# Patient Record
Sex: FEMALE | Race: WHITE | NOT HISPANIC OR LATINO
[De-identification: names, ages, dates, MRNs, and addresses within clinical notes are randomized per-mention and may not be internally consistent; named-entity substitution may affect disease eponyms.]

---

## 2023-06-02 ENCOUNTER — NON-APPOINTMENT (OUTPATIENT)
Age: 33
End: 2023-06-02

## 2023-06-05 ENCOUNTER — NON-APPOINTMENT (OUTPATIENT)
Age: 33
End: 2023-06-05

## 2023-06-05 ENCOUNTER — LABORATORY RESULT (OUTPATIENT)
Age: 33
End: 2023-06-05

## 2023-06-05 ENCOUNTER — APPOINTMENT (OUTPATIENT)
Dept: OBGYN | Facility: CLINIC | Age: 33
End: 2023-06-05
Payer: COMMERCIAL

## 2023-06-05 ENCOUNTER — ASOB RESULT (OUTPATIENT)
Age: 33
End: 2023-06-05

## 2023-06-05 ENCOUNTER — APPOINTMENT (OUTPATIENT)
Dept: OBGYN | Facility: CLINIC | Age: 33
End: 2023-06-05

## 2023-06-05 VITALS
DIASTOLIC BLOOD PRESSURE: 68 MMHG | WEIGHT: 97 LBS | BODY MASS INDEX: 16.56 KG/M2 | HEIGHT: 64 IN | SYSTOLIC BLOOD PRESSURE: 102 MMHG

## 2023-06-05 DIAGNOSIS — F41.9 ANXIETY DISORDER, UNSPECIFIED: ICD-10-CM

## 2023-06-05 DIAGNOSIS — I73.00 RAYNAUD'S SYNDROME W/OUT GANGRENE: ICD-10-CM

## 2023-06-05 DIAGNOSIS — Z78.9 OTHER SPECIFIED HEALTH STATUS: ICD-10-CM

## 2023-06-05 DIAGNOSIS — N94.10 UNSPECIFIED DYSPAREUNIA: ICD-10-CM

## 2023-06-05 DIAGNOSIS — Z80.52 FAMILY HISTORY OF MALIGNANT NEOPLASM OF BLADDER: ICD-10-CM

## 2023-06-05 DIAGNOSIS — E55.9 VITAMIN D DEFICIENCY, UNSPECIFIED: ICD-10-CM

## 2023-06-05 DIAGNOSIS — Z83.3 FAMILY HISTORY OF DIABETES MELLITUS: ICD-10-CM

## 2023-06-05 DIAGNOSIS — Z82.0 FAMILY HISTORY OF EPILEPSY AND OTHER DISEASES OF THE NERVOUS SYSTEM: ICD-10-CM

## 2023-06-05 DIAGNOSIS — Z82.61 FAMILY HISTORY OF ARTHRITIS: ICD-10-CM

## 2023-06-05 DIAGNOSIS — Z13.1 ENCOUNTER FOR SCREENING FOR DIABETES MELLITUS: ICD-10-CM

## 2023-06-05 DIAGNOSIS — Z00.00 ENCOUNTER FOR GENERAL ADULT MEDICAL EXAMINATION W/OUT ABNORMAL FINDINGS: ICD-10-CM

## 2023-06-05 LAB
BILIRUB UR QL STRIP: NORMAL
GLUCOSE UR-MCNC: NORMAL
HCG UR QL: 0.2 EU/DL
HGB UR QL STRIP.AUTO: NORMAL
KETONES UR-MCNC: NORMAL
LEUKOCYTE ESTERASE UR QL STRIP: NORMAL
NITRITE UR QL STRIP: POSITIVE
PH UR STRIP: 7
PROT UR STRIP-MCNC: NORMAL
SP GR UR STRIP: 1.01

## 2023-06-05 PROCEDURE — 99205 OFFICE O/P NEW HI 60 MIN: CPT

## 2023-06-05 PROCEDURE — 36415 COLL VENOUS BLD VENIPUNCTURE: CPT

## 2023-06-05 PROCEDURE — 76817 TRANSVAGINAL US OBSTETRIC: CPT

## 2023-06-05 RX ORDER — PRENATAL VIT 49/IRON FUM/FOLIC 6.75-0.2MG
TABLET ORAL
Refills: 0 | Status: ACTIVE | COMMUNITY

## 2023-06-06 ENCOUNTER — NON-APPOINTMENT (OUTPATIENT)
Age: 33
End: 2023-06-06

## 2023-06-06 DIAGNOSIS — R79.89 OTHER SPECIFIED ABNORMAL FINDINGS OF BLOOD CHEMISTRY: ICD-10-CM

## 2023-06-06 LAB
25(OH)D3 SERPL-MCNC: 24.5 NG/ML
ALBUMIN SERPL ELPH-MCNC: 4.9 G/DL
ALP BLD-CCNC: 45 U/L
ALT SERPL-CCNC: 23 U/L
ANION GAP SERPL CALC-SCNC: 12 MMOL/L
AST SERPL-CCNC: 21 U/L
BILIRUB SERPL-MCNC: 1 MG/DL
BUN SERPL-MCNC: 9 MG/DL
CALCIUM SERPL-MCNC: 10.3 MG/DL
CHLORIDE SERPL-SCNC: 101 MMOL/L
CO2 SERPL-SCNC: 26 MMOL/L
COVID-19 NUCLEOCAPSID  GAM ANTIBODY INTERPRETATION: POSITIVE
COVID-19 SPIKE DOMAIN ANTIBODY INTERPRETATION: POSITIVE
CREAT SERPL-MCNC: 0.66 MG/DL
EGFR: 119 ML/MIN/1.73M2
ESTIMATED AVERAGE GLUCOSE: 111 MG/DL
FERRITIN SERPL-MCNC: 131 NG/ML
GLUCOSE SERPL-MCNC: 69 MG/DL
HBA1C MFR BLD HPLC: 5.5 %
HBV SURFACE AG SER QL: NONREACTIVE
HCV AB SER QL: NONREACTIVE
HCV S/CO RATIO: 0.12 S/CO
HIV1+2 AB SPEC QL IA.RAPID: NONREACTIVE
POTASSIUM SERPL-SCNC: 4.1 MMOL/L
PROT SERPL-MCNC: 7.4 G/DL
SARS-COV-2 AB SERPL IA-ACNC: >250 U/ML
SARS-COV-2 AB SERPL QL IA: 3.94 INDEX
SODIUM SERPL-SCNC: 139 MMOL/L
TSH SERPL-ACNC: 4.44 UIU/ML

## 2023-06-08 LAB
ABO + RH PNL BLD: NORMAL
BACTERIA UR CULT: NORMAL
BLD GP AB SCN SERPL QL: NORMAL
C TRACH RRNA SPEC QL NAA+PROBE: NOT DETECTED
HGB A MFR BLD: 97.4 %
HGB A2 MFR BLD: 2.6 %
HGB FRACT BLD-IMP: NORMAL
LEAD BLD-MCNC: <1 UG/DL
MEV IGG FLD QL IA: 290 AU/ML
MEV IGG+IGM SER-IMP: POSITIVE
MUV AB SER-ACNC: NEGATIVE
MUV IGG SER QL IA: <5 AU/ML
N GONORRHOEA RRNA SPEC QL NAA+PROBE: NOT DETECTED
RUBV IGG FLD-ACNC: 1.2 INDEX
RUBV IGG SER-IMP: POSITIVE
SOURCE AMPLIFICATION: NORMAL
T PALLIDUM AB SER QL IA: NEGATIVE
VZV AB TITR SER: POSITIVE
VZV IGG SER IF-ACNC: 504.4 INDEX

## 2023-06-09 ENCOUNTER — NON-APPOINTMENT (OUTPATIENT)
Age: 33
End: 2023-06-09

## 2023-06-11 ENCOUNTER — TRANSCRIPTION ENCOUNTER (OUTPATIENT)
Age: 33
End: 2023-06-11

## 2023-06-12 ENCOUNTER — TRANSCRIPTION ENCOUNTER (OUTPATIENT)
Age: 33
End: 2023-06-12

## 2023-07-07 ENCOUNTER — APPOINTMENT (OUTPATIENT)
Dept: OBGYN | Facility: CLINIC | Age: 33
End: 2023-07-07
Payer: COMMERCIAL

## 2023-07-07 VITALS
BODY MASS INDEX: 17.24 KG/M2 | DIASTOLIC BLOOD PRESSURE: 62 MMHG | HEIGHT: 64 IN | WEIGHT: 101 LBS | SYSTOLIC BLOOD PRESSURE: 106 MMHG

## 2023-07-07 LAB
BILIRUB UR QL STRIP: NORMAL
GLUCOSE UR-MCNC: NORMAL
HCG UR QL: 0.2 EU/DL
HGB UR QL STRIP.AUTO: NORMAL
KETONES UR-MCNC: NORMAL
LEUKOCYTE ESTERASE UR QL STRIP: NORMAL
NITRITE UR QL STRIP: NORMAL
PH UR STRIP: 7
PROT UR STRIP-MCNC: NORMAL
SP GR UR STRIP: 1.01

## 2023-07-07 PROCEDURE — 0502F SUBSEQUENT PRENATAL CARE: CPT

## 2023-07-07 RX ORDER — LEVOTHYROXINE SODIUM 50 UG/1
50 TABLET ORAL
Refills: 0 | Status: COMPLETED | COMMUNITY
End: 2023-07-07

## 2023-07-10 ENCOUNTER — NON-APPOINTMENT (OUTPATIENT)
Age: 33
End: 2023-07-10

## 2023-07-10 ENCOUNTER — TRANSCRIPTION ENCOUNTER (OUTPATIENT)
Age: 33
End: 2023-07-10

## 2023-07-19 ENCOUNTER — NON-APPOINTMENT (OUTPATIENT)
Age: 33
End: 2023-07-19

## 2023-07-27 ENCOUNTER — NON-APPOINTMENT (OUTPATIENT)
Age: 33
End: 2023-07-27

## 2023-08-01 ENCOUNTER — NON-APPOINTMENT (OUTPATIENT)
Age: 33
End: 2023-08-01

## 2023-08-01 ENCOUNTER — APPOINTMENT (OUTPATIENT)
Dept: OBGYN | Facility: CLINIC | Age: 33
End: 2023-08-01
Payer: COMMERCIAL

## 2023-08-01 VITALS
WEIGHT: 100.06 LBS | HEIGHT: 64 IN | BODY MASS INDEX: 17.08 KG/M2 | SYSTOLIC BLOOD PRESSURE: 110 MMHG | DIASTOLIC BLOOD PRESSURE: 56 MMHG

## 2023-08-01 DIAGNOSIS — Z34.91 ENCOUNTER FOR SUPERVISION OF NORMAL PREGNANCY, UNSPECIFIED, FIRST TRIMESTER: ICD-10-CM

## 2023-08-01 PROCEDURE — 0502F SUBSEQUENT PRENATAL CARE: CPT

## 2023-08-03 PROBLEM — Z34.91 FIRST TRIMESTER PREGNANCY: Status: RESOLVED | Noted: 2023-06-05 | Resolved: 2023-08-03

## 2023-08-07 ENCOUNTER — NON-APPOINTMENT (OUTPATIENT)
Age: 33
End: 2023-08-07

## 2023-08-15 ENCOUNTER — NON-APPOINTMENT (OUTPATIENT)
Age: 33
End: 2023-08-15

## 2023-08-31 ENCOUNTER — APPOINTMENT (OUTPATIENT)
Dept: OBGYN | Facility: CLINIC | Age: 33
End: 2023-08-31
Payer: COMMERCIAL

## 2023-08-31 VITALS
HEIGHT: 64 IN | SYSTOLIC BLOOD PRESSURE: 100 MMHG | WEIGHT: 104 LBS | DIASTOLIC BLOOD PRESSURE: 60 MMHG | BODY MASS INDEX: 17.75 KG/M2

## 2023-08-31 LAB
BILIRUB UR QL STRIP: NORMAL
GLUCOSE UR-MCNC: NORMAL
HCG UR QL: 0.2 EU/DL
HGB UR QL STRIP.AUTO: NORMAL
KETONES UR-MCNC: NORMAL
LEUKOCYTE ESTERASE UR QL STRIP: NORMAL
NITRITE UR QL STRIP: NORMAL
PH UR STRIP: 8.5
PROT UR STRIP-MCNC: NORMAL
SP GR UR STRIP: 1.01

## 2023-08-31 PROCEDURE — 0502F SUBSEQUENT PRENATAL CARE: CPT

## 2023-09-12 ENCOUNTER — NON-APPOINTMENT (OUTPATIENT)
Age: 33
End: 2023-09-12

## 2023-09-28 ENCOUNTER — APPOINTMENT (OUTPATIENT)
Dept: OBGYN | Facility: CLINIC | Age: 33
End: 2023-09-28
Payer: COMMERCIAL

## 2023-09-28 VITALS
SYSTOLIC BLOOD PRESSURE: 98 MMHG | DIASTOLIC BLOOD PRESSURE: 62 MMHG | BODY MASS INDEX: 18.44 KG/M2 | HEIGHT: 64 IN | WEIGHT: 108 LBS

## 2023-09-28 PROCEDURE — 0502F SUBSEQUENT PRENATAL CARE: CPT

## 2023-09-28 PROCEDURE — 90471 IMMUNIZATION ADMIN: CPT

## 2023-09-28 PROCEDURE — 90686 IIV4 VACC NO PRSV 0.5 ML IM: CPT

## 2023-10-04 ENCOUNTER — NON-APPOINTMENT (OUTPATIENT)
Age: 33
End: 2023-10-04

## 2023-10-04 ENCOUNTER — TRANSCRIPTION ENCOUNTER (OUTPATIENT)
Age: 33
End: 2023-10-04

## 2023-11-02 ENCOUNTER — MED ADMIN CHARGE (OUTPATIENT)
Age: 33
End: 2023-11-02

## 2023-11-02 ENCOUNTER — NON-APPOINTMENT (OUTPATIENT)
Age: 33
End: 2023-11-02

## 2023-11-02 ENCOUNTER — APPOINTMENT (OUTPATIENT)
Dept: OBGYN | Facility: CLINIC | Age: 33
End: 2023-11-02
Payer: COMMERCIAL

## 2023-11-02 VITALS
BODY MASS INDEX: 18.95 KG/M2 | WEIGHT: 111 LBS | HEIGHT: 64 IN | SYSTOLIC BLOOD PRESSURE: 112 MMHG | DIASTOLIC BLOOD PRESSURE: 60 MMHG

## 2023-11-02 LAB
BILIRUB UR QL STRIP: NORMAL
GLUCOSE UR-MCNC: NORMAL
HCG UR QL: 0.2 EU/DL
HGB UR QL STRIP.AUTO: NORMAL
KETONES UR-MCNC: NORMAL
LEUKOCYTE ESTERASE UR QL STRIP: NORMAL
NITRITE UR QL STRIP: NORMAL
PH UR STRIP: 7.5
PROT UR STRIP-MCNC: NORMAL
SP GR UR STRIP: 1.01

## 2023-11-02 PROCEDURE — 0502F SUBSEQUENT PRENATAL CARE: CPT

## 2023-11-02 PROCEDURE — 36415 COLL VENOUS BLD VENIPUNCTURE: CPT

## 2023-11-02 PROCEDURE — 90471 IMMUNIZATION ADMIN: CPT

## 2023-11-02 PROCEDURE — 90715 TDAP VACCINE 7 YRS/> IM: CPT

## 2023-11-04 ENCOUNTER — TRANSCRIPTION ENCOUNTER (OUTPATIENT)
Age: 33
End: 2023-11-04

## 2023-11-04 LAB
BASOPHILS # BLD AUTO: 0.04 K/UL
BASOPHILS NFR BLD AUTO: 0.4 %
EOSINOPHIL # BLD AUTO: 0.08 K/UL
EOSINOPHIL NFR BLD AUTO: 0.8 %
FERRITIN SERPL-MCNC: 25 NG/ML
GLUCOSE 1H P 50 G GLC PO SERPL-MCNC: 87 MG/DL
HCT VFR BLD CALC: 38.3 %
HGB BLD-MCNC: 12 G/DL
IMM GRANULOCYTES NFR BLD AUTO: 0.4 %
LYMPHOCYTES # BLD AUTO: 1.42 K/UL
LYMPHOCYTES NFR BLD AUTO: 14.5 %
MAN DIFF?: NORMAL
MCHC RBC-ENTMCNC: 31.3 GM/DL
MCHC RBC-ENTMCNC: 31.7 PG
MCV RBC AUTO: 101.1 FL
MONOCYTES # BLD AUTO: 0.58 K/UL
MONOCYTES NFR BLD AUTO: 5.9 %
NEUTROPHILS # BLD AUTO: 7.64 K/UL
NEUTROPHILS NFR BLD AUTO: 78 %
PLATELET # BLD AUTO: 198 K/UL
RBC # BLD: 3.79 M/UL
RBC # FLD: 13.2 %
WBC # FLD AUTO: 9.8 K/UL

## 2023-11-06 ENCOUNTER — TRANSCRIPTION ENCOUNTER (OUTPATIENT)
Age: 33
End: 2023-11-06

## 2023-11-10 ENCOUNTER — NON-APPOINTMENT (OUTPATIENT)
Age: 33
End: 2023-11-10

## 2023-11-20 ENCOUNTER — APPOINTMENT (OUTPATIENT)
Dept: OBGYN | Facility: CLINIC | Age: 33
End: 2023-11-20
Payer: COMMERCIAL

## 2023-11-20 VITALS
WEIGHT: 113 LBS | SYSTOLIC BLOOD PRESSURE: 96 MMHG | BODY MASS INDEX: 19.29 KG/M2 | HEIGHT: 64 IN | DIASTOLIC BLOOD PRESSURE: 60 MMHG

## 2023-11-20 PROCEDURE — 0502F SUBSEQUENT PRENATAL CARE: CPT

## 2023-12-05 ENCOUNTER — NON-APPOINTMENT (OUTPATIENT)
Age: 33
End: 2023-12-05

## 2023-12-05 ENCOUNTER — APPOINTMENT (OUTPATIENT)
Dept: OBGYN | Facility: CLINIC | Age: 33
End: 2023-12-05
Payer: COMMERCIAL

## 2023-12-05 VITALS
DIASTOLIC BLOOD PRESSURE: 70 MMHG | WEIGHT: 114.31 LBS | BODY MASS INDEX: 19.52 KG/M2 | HEIGHT: 64 IN | SYSTOLIC BLOOD PRESSURE: 104 MMHG

## 2023-12-05 PROCEDURE — 0502F SUBSEQUENT PRENATAL CARE: CPT

## 2023-12-06 LAB
BILIRUB UR QL STRIP: NORMAL
GLUCOSE UR-MCNC: NORMAL
HCG UR QL: 0.2 EU/DL
HGB UR QL STRIP.AUTO: NORMAL
KETONES UR-MCNC: NORMAL
LEUKOCYTE ESTERASE UR QL STRIP: NORMAL
NITRITE UR QL STRIP: NORMAL
PH UR STRIP: 6
PROT UR STRIP-MCNC: NORMAL
SP GR UR STRIP: <=1.005

## 2023-12-15 ENCOUNTER — NON-APPOINTMENT (OUTPATIENT)
Age: 33
End: 2023-12-15

## 2023-12-19 ENCOUNTER — APPOINTMENT (OUTPATIENT)
Dept: ENDOCRINOLOGY | Facility: CLINIC | Age: 33
End: 2023-12-19

## 2023-12-21 DIAGNOSIS — Z32.00 ENCOUNTER FOR PREGNANCY TEST, RESULT UNKNOWN: ICD-10-CM

## 2023-12-21 DIAGNOSIS — Z20.822 CONTACT WITH AND (SUSPECTED) EXPOSURE TO COVID-19: ICD-10-CM

## 2023-12-21 DIAGNOSIS — Z34.02 ENCOUNTER FOR SUPERVISION OF NORMAL FIRST PREGNANCY, SECOND TRIMESTER: ICD-10-CM

## 2023-12-22 ENCOUNTER — APPOINTMENT (OUTPATIENT)
Dept: OBGYN | Facility: CLINIC | Age: 33
End: 2023-12-22
Payer: COMMERCIAL

## 2023-12-22 VITALS
SYSTOLIC BLOOD PRESSURE: 100 MMHG | WEIGHT: 117 LBS | DIASTOLIC BLOOD PRESSURE: 64 MMHG | HEIGHT: 64 IN | BODY MASS INDEX: 19.97 KG/M2

## 2023-12-22 DIAGNOSIS — Z34.93 ENCOUNTER FOR SUPERVISION OF NORMAL PREGNANCY, UNSPECIFIED, THIRD TRIMESTER: ICD-10-CM

## 2023-12-22 DIAGNOSIS — Z23 ENCOUNTER FOR IMMUNIZATION: ICD-10-CM

## 2023-12-22 PROCEDURE — 0502F SUBSEQUENT PRENATAL CARE: CPT

## 2023-12-22 RX ORDER — LEVOTHYROXINE SODIUM 50 UG/1
50 CAPSULE ORAL
Refills: 0 | Status: ACTIVE | COMMUNITY

## 2023-12-22 RX ORDER — LEVOTHYROXINE SODIUM 0.1 MG/1
100 TABLET ORAL
Refills: 0 | Status: ACTIVE | COMMUNITY

## 2023-12-23 PROBLEM — Z23 IMMUNIZATION DUE: Status: RESOLVED | Noted: 2023-09-28 | Resolved: 2023-12-23

## 2023-12-23 LAB
BILIRUB UR QL STRIP: NORMAL
COLLECTION METHOD: NORMAL
GLUCOSE UR-MCNC: NORMAL
HCG UR QL: 0.2 EU/DL
HGB UR QL STRIP.AUTO: NORMAL
KETONES UR-MCNC: NORMAL
LEUKOCYTE ESTERASE UR QL STRIP: NORMAL
NITRITE UR QL STRIP: NORMAL
PH UR STRIP: 7
PROT UR STRIP-MCNC: NORMAL
SP GR UR STRIP: 1.01

## 2023-12-26 ENCOUNTER — NON-APPOINTMENT (OUTPATIENT)
Age: 33
End: 2023-12-26

## 2023-12-27 ENCOUNTER — NON-APPOINTMENT (OUTPATIENT)
Age: 33
End: 2023-12-27

## 2023-12-29 ENCOUNTER — LABORATORY RESULT (OUTPATIENT)
Age: 33
End: 2023-12-29

## 2023-12-29 ENCOUNTER — APPOINTMENT (OUTPATIENT)
Dept: OBGYN | Facility: CLINIC | Age: 33
End: 2023-12-29
Payer: COMMERCIAL

## 2023-12-29 VITALS
WEIGHT: 119 LBS | DIASTOLIC BLOOD PRESSURE: 60 MMHG | BODY MASS INDEX: 20.32 KG/M2 | SYSTOLIC BLOOD PRESSURE: 100 MMHG | HEIGHT: 64 IN

## 2023-12-29 PROCEDURE — 36415 COLL VENOUS BLD VENIPUNCTURE: CPT

## 2023-12-29 PROCEDURE — 0502F SUBSEQUENT PRENATAL CARE: CPT

## 2023-12-30 LAB
BASOPHILS # BLD AUTO: 0.04 K/UL
BASOPHILS NFR BLD AUTO: 0.5 %
EOSINOPHIL # BLD AUTO: 0.07 K/UL
EOSINOPHIL NFR BLD AUTO: 0.9 %
FERRITIN SERPL-MCNC: 36 NG/ML
HCT VFR BLD CALC: 41.3 %
HCV AB SER QL: NONREACTIVE
HCV S/CO RATIO: 0.12 S/CO
HGB BLD-MCNC: 13.1 G/DL
HIV1+2 AB SPEC QL IA.RAPID: NONREACTIVE
IMM GRANULOCYTES NFR BLD AUTO: 0.5 %
LYMPHOCYTES # BLD AUTO: 1.54 K/UL
LYMPHOCYTES NFR BLD AUTO: 19.5 %
MAN DIFF?: NORMAL
MCHC RBC-ENTMCNC: 31.3 PG
MCHC RBC-ENTMCNC: 31.7 GM/DL
MCV RBC AUTO: 98.8 FL
MONOCYTES # BLD AUTO: 0.43 K/UL
MONOCYTES NFR BLD AUTO: 5.4 %
NEUTROPHILS # BLD AUTO: 5.77 K/UL
NEUTROPHILS NFR BLD AUTO: 73.2 %
PLATELET # BLD AUTO: 166 K/UL
RBC # BLD: 4.18 M/UL
RBC # FLD: 13.2 %
T PALLIDUM AB SER QL IA: NEGATIVE
WBC # FLD AUTO: 7.89 K/UL

## 2023-12-31 LAB
GP B STREP DNA SPEC QL NAA+PROBE: DETECTED
SOURCE GBS: NORMAL

## 2024-01-02 ENCOUNTER — NON-APPOINTMENT (OUTPATIENT)
Age: 34
End: 2024-01-02

## 2024-01-03 ENCOUNTER — TRANSCRIPTION ENCOUNTER (OUTPATIENT)
Age: 34
End: 2024-01-03

## 2024-01-03 LAB
C TRACH RRNA SPEC QL NAA+PROBE: NOT DETECTED
N GONORRHOEA RRNA SPEC QL NAA+PROBE: NOT DETECTED
SOURCE AMPLIFICATION: NORMAL

## 2024-01-04 ENCOUNTER — APPOINTMENT (OUTPATIENT)
Dept: OBGYN | Facility: CLINIC | Age: 34
End: 2024-01-04
Payer: COMMERCIAL

## 2024-01-04 VITALS
WEIGHT: 119 LBS | DIASTOLIC BLOOD PRESSURE: 62 MMHG | HEIGHT: 64 IN | SYSTOLIC BLOOD PRESSURE: 100 MMHG | BODY MASS INDEX: 20.32 KG/M2

## 2024-01-04 LAB
BILIRUB UR QL STRIP: NORMAL
CLARITY UR: CLEAR
COLLECTION METHOD: NORMAL
GLUCOSE UR-MCNC: NORMAL
HCG UR QL: 0.2 EU/DL
HGB UR QL STRIP.AUTO: NORMAL
KETONES UR-MCNC: NORMAL
LEUKOCYTE ESTERASE UR QL STRIP: NORMAL
NITRITE UR QL STRIP: NORMAL
PH UR STRIP: 7
PROT UR STRIP-MCNC: NORMAL
SP GR UR STRIP: 1.01

## 2024-01-04 PROCEDURE — 0502F SUBSEQUENT PRENATAL CARE: CPT

## 2024-01-06 ENCOUNTER — NON-APPOINTMENT (OUTPATIENT)
Age: 34
End: 2024-01-06

## 2024-01-11 ENCOUNTER — NON-APPOINTMENT (OUTPATIENT)
Age: 34
End: 2024-01-11

## 2024-01-11 ENCOUNTER — APPOINTMENT (OUTPATIENT)
Dept: OBGYN | Facility: CLINIC | Age: 34
End: 2024-01-11
Payer: COMMERCIAL

## 2024-01-11 PROCEDURE — 0502F SUBSEQUENT PRENATAL CARE: CPT

## 2024-01-13 ENCOUNTER — TRANSCRIPTION ENCOUNTER (OUTPATIENT)
Age: 34
End: 2024-01-13

## 2024-01-16 ENCOUNTER — RESULT CHARGE (OUTPATIENT)
Age: 34
End: 2024-01-16

## 2024-01-17 ENCOUNTER — NON-APPOINTMENT (OUTPATIENT)
Age: 34
End: 2024-01-17

## 2024-01-17 ENCOUNTER — APPOINTMENT (OUTPATIENT)
Dept: OBGYN | Facility: CLINIC | Age: 34
End: 2024-01-17
Payer: COMMERCIAL

## 2024-01-17 ENCOUNTER — APPOINTMENT (OUTPATIENT)
Dept: OBGYN | Facility: CLINIC | Age: 34
End: 2024-01-17

## 2024-01-17 VITALS — SYSTOLIC BLOOD PRESSURE: 102 MMHG | HEIGHT: 64 IN | DIASTOLIC BLOOD PRESSURE: 60 MMHG

## 2024-01-17 DIAGNOSIS — R30.0 DYSURIA: ICD-10-CM

## 2024-01-17 PROCEDURE — 99212 OFFICE O/P EST SF 10 MIN: CPT

## 2024-01-18 ENCOUNTER — APPOINTMENT (OUTPATIENT)
Dept: OBGYN | Facility: CLINIC | Age: 34
End: 2024-01-18

## 2024-01-20 DIAGNOSIS — N39.0 URINARY TRACT INFECTION, SITE NOT SPECIFIED: ICD-10-CM

## 2024-01-20 RX ORDER — NITROFURANTOIN MACROCRYSTALS 100 MG/1
100 CAPSULE ORAL
Qty: 14 | Refills: 1 | Status: ACTIVE | COMMUNITY
Start: 2024-01-20 | End: 1900-01-01

## 2024-01-21 LAB — BACTERIA UR CULT: ABNORMAL

## 2024-01-23 ENCOUNTER — NON-APPOINTMENT (OUTPATIENT)
Age: 34
End: 2024-01-23

## 2024-01-24 ENCOUNTER — NON-APPOINTMENT (OUTPATIENT)
Age: 34
End: 2024-01-24

## 2024-01-25 ENCOUNTER — APPOINTMENT (OUTPATIENT)
Dept: OBGYN | Facility: CLINIC | Age: 34
End: 2024-01-25

## 2024-01-25 ENCOUNTER — APPOINTMENT (OUTPATIENT)
Dept: OBGYN | Facility: CLINIC | Age: 34
End: 2024-01-25
Payer: COMMERCIAL

## 2024-01-29 ENCOUNTER — APPOINTMENT (OUTPATIENT)
Dept: OBGYN | Facility: CLINIC | Age: 34
End: 2024-01-29
Payer: COMMERCIAL

## 2024-01-29 ENCOUNTER — NON-APPOINTMENT (OUTPATIENT)
Age: 34
End: 2024-01-29

## 2024-01-29 VITALS
SYSTOLIC BLOOD PRESSURE: 118 MMHG | DIASTOLIC BLOOD PRESSURE: 70 MMHG | BODY MASS INDEX: 16.94 KG/M2 | HEIGHT: 64 IN | WEIGHT: 99.25 LBS

## 2024-01-29 PROCEDURE — 0503F POSTPARTUM CARE VISIT: CPT

## 2024-01-29 NOTE — HISTORY OF PRESENT ILLNESS
[FreeTextEntry1] : Delivery:   on 24  Sex: Girl Name: Bridgette Age: 2wks Birth weight:   5-10#     Current weight: Probably 7# Breastfeeding well Bleeding is scant Laceration feels much better Eating and drinking well Sleeping well when she can Baby blues--none, EDS = 7 Support is great from   Recently got C. diff--from [facility] given in hospital for +GBS. Currently on Vanco given by her PCP, much better. Also taking probiotics.  O: Laceration no longer swollen. Healing very well.  Stitches are long and bothersome. 2 labial stitches: ends cut off, leaving knot 1 perineal stitch: cut off including knot   A: 2 week PP doing well C. diff resolving Laceration healing well  P: Self-care and baby care discussed Breastfeeding reviewed RTO for 6 week PP visit

## 2024-01-31 ENCOUNTER — TRANSCRIPTION ENCOUNTER (OUTPATIENT)
Age: 34
End: 2024-01-31

## 2024-02-21 ENCOUNTER — APPOINTMENT (OUTPATIENT)
Dept: OBGYN | Facility: CLINIC | Age: 34
End: 2024-02-21
Payer: COMMERCIAL

## 2024-02-21 VITALS
WEIGHT: 98 LBS | SYSTOLIC BLOOD PRESSURE: 100 MMHG | DIASTOLIC BLOOD PRESSURE: 69 MMHG | HEIGHT: 64 IN | BODY MASS INDEX: 16.73 KG/M2

## 2024-02-21 DIAGNOSIS — M62.08 SEPARATION OF MUSCLE (NONTRAUMATIC), OTHER SITE: ICD-10-CM

## 2024-02-21 PROCEDURE — 0503F POSTPARTUM CARE VISIT: CPT

## 2024-02-21 NOTE — HISTORY OF PRESENT ILLNESS
[FreeTextEntry1] : Delivery:  on 2024 by Sex: Girl Name: Bridgette Age: 6 wks Birth weight:   5-10#     Current weight: 8-9#  Breastfeeding well Bleeding: a bit of spotting only Laceration feels a lot better Eating and drinking well Sleeping well when she can Baby blues, EDS = 6 Support is good from  and parents/inlaws Contraception: condoms  Recovered from C. diff  PE: Lungs clear bilaterally Heart RRR no murmur Thyroid WNL Breasts soft, NT, no masses, nipples intact, lactating Abdomen soft, NT, 1FB diastasis. Incision Ext neg Pelvic: BUS neg Vulva no lesions Vagina well-healed. Introitus very tight. Only tolerated 1-finger exam. Cx LCP, NT Uterus AV, small, firm, NT Adnexa palpable, NT Tone: good Kegel  A: 6 week PP doing well Hx dyspareunia  P: Self-care and baby care discussed Breastfeeding reviewed Diet and vitamins reviewed Cleared for exercise and sex Contraception options discussed--will use condoms for now Referred to [facility] (had been there during pregnancy) RTO for annual/prn

## 2024-04-24 ENCOUNTER — APPOINTMENT (OUTPATIENT)
Dept: OBGYN | Facility: CLINIC | Age: 34
End: 2024-04-24
Payer: COMMERCIAL

## 2024-04-24 VITALS
BODY MASS INDEX: 16.73 KG/M2 | SYSTOLIC BLOOD PRESSURE: 102 MMHG | DIASTOLIC BLOOD PRESSURE: 60 MMHG | WEIGHT: 98 LBS | HEIGHT: 64 IN

## 2024-04-24 PROCEDURE — 17250 CHEM CAUT OF GRANLTJ TISSUE: CPT

## 2024-04-24 NOTE — HISTORY OF PRESENT ILLNESS
[FreeTextEntry1] : Here for follow up of dyspareunia. Pt is 3 months postpartum. Had a perineal and labial laceration. Going to [facility]PT and things are improving, except for 1 little tender spot on right labia.  PE: Right outside right labia minora is a small spot of granulation tissue that got caught outside laceration during healing.  AgNO3 applied to granulation tissue following liberal application of petroleum jelly to surrounding area.  P: RTO 1 wk to see if another application is needed.

## 2024-05-08 ENCOUNTER — APPOINTMENT (OUTPATIENT)
Dept: OBGYN | Facility: CLINIC | Age: 34
End: 2024-05-08
Payer: COMMERCIAL

## 2024-05-08 VITALS
DIASTOLIC BLOOD PRESSURE: 60 MMHG | HEIGHT: 64 IN | WEIGHT: 96 LBS | SYSTOLIC BLOOD PRESSURE: 100 MMHG | BODY MASS INDEX: 16.39 KG/M2

## 2024-05-08 PROCEDURE — 99212 OFFICE O/P EST SF 10 MIN: CPT

## 2024-05-08 NOTE — HISTORY OF PRESENT ILLNESS
[FreeTextEntry1] : Here to follow up granulation tissue of vulva that was treated with AgNO3. Feels much better. Has on more PT visit left. Was able to have penetrative sex without discomfort for the first time in a long while.  PE: No granulation tissue remaining Vulva pale with some red areas, consistent with low estrogen from breastfeeding QTip test: no uncomfortable areas  A: Completely healed Mild atrophic vulvovaginitis P: Gave sample of Uberlube Continue with PT as needed Offered Estrace, pt does not feel she needs it RTO for annual

## 2024-10-10 ENCOUNTER — APPOINTMENT (OUTPATIENT)
Dept: OBGYN | Facility: CLINIC | Age: 34
End: 2024-10-10

## 2025-05-15 ENCOUNTER — NON-APPOINTMENT (OUTPATIENT)
Age: 35
End: 2025-05-15

## 2025-05-15 ENCOUNTER — APPOINTMENT (OUTPATIENT)
Dept: OBGYN | Facility: CLINIC | Age: 35
End: 2025-05-15
Payer: COMMERCIAL

## 2025-05-15 VITALS
HEIGHT: 64 IN | DIASTOLIC BLOOD PRESSURE: 60 MMHG | WEIGHT: 97 LBS | SYSTOLIC BLOOD PRESSURE: 104 MMHG | BODY MASS INDEX: 16.56 KG/M2

## 2025-05-15 DIAGNOSIS — Z32.00 ENCOUNTER FOR PREGNANCY TEST, RESULT UNKNOWN: ICD-10-CM

## 2025-05-15 PROCEDURE — 36415 COLL VENOUS BLD VENIPUNCTURE: CPT

## 2025-05-15 PROCEDURE — 99214 OFFICE O/P EST MOD 30 MIN: CPT | Mod: 25

## 2025-05-15 PROCEDURE — 0500F INITIAL PRENATAL CARE VISIT: CPT

## 2025-05-16 LAB
25(OH)D3 SERPL-MCNC: 45.1 NG/ML
ABORH: NORMAL
ALBUMIN SERPL ELPH-MCNC: 4.5 G/DL
ALP BLD-CCNC: 43 U/L
ALT SERPL-CCNC: 15 U/L
ANION GAP SERPL CALC-SCNC: 11 MMOL/L
ANTIBODY SCREEN: NORMAL
APPEARANCE: CLEAR
AST SERPL-CCNC: 17 U/L
BASOPHILS # BLD AUTO: 0.03 K/UL
BASOPHILS NFR BLD AUTO: 0.5 %
BILIRUB SERPL-MCNC: 0.9 MG/DL
BILIRUBIN URINE: NEGATIVE
BLOOD URINE: NEGATIVE
BUN SERPL-MCNC: 11 MG/DL
C TRACH RRNA SPEC QL NAA+PROBE: NOT DETECTED
CALCIUM SERPL-MCNC: 9.5 MG/DL
CHLORIDE SERPL-SCNC: 103 MMOL/L
CO2 SERPL-SCNC: 23 MMOL/L
COLOR: YELLOW
CREAT SERPL-MCNC: 0.57 MG/DL
EGFRCR SERPLBLD CKD-EPI 2021: 121 ML/MIN/1.73M2
EOSINOPHIL # BLD AUTO: 0.05 K/UL
EOSINOPHIL NFR BLD AUTO: 0.9 %
ESTIMATED AVERAGE GLUCOSE: 111 MG/DL
FERRITIN SERPL-MCNC: 101 NG/ML
GLUCOSE QUALITATIVE U: NEGATIVE
GLUCOSE SERPL-MCNC: 81 MG/DL
HBA1C MFR BLD HPLC: 5.5 %
HBV SURFACE AG SER QL: NONREACTIVE
HCT VFR BLD CALC: 41.9 %
HCV AB SER QL: NONREACTIVE
HCV S/CO RATIO: 0.15 S/CO
HGB A MFR BLD: 97.6 %
HGB A2 MFR BLD: 2.4 %
HGB BLD-MCNC: 13.1 G/DL
HGB FRACT BLD-IMP: NORMAL
HIV1+2 AB SPEC QL IA.RAPID: NONREACTIVE
HPV HIGH+LOW RISK DNA PNL CVX: NOT DETECTED
IMM GRANULOCYTES NFR BLD AUTO: 0.2 %
KETONES URINE: NEGATIVE
LEUKOCYTE ESTERASE URINE: NEGATIVE
LYMPHOCYTES # BLD AUTO: 1.54 K/UL
LYMPHOCYTES NFR BLD AUTO: 28.2 %
MAN DIFF?: NORMAL
MCHC RBC-ENTMCNC: 29.4 PG
MCHC RBC-ENTMCNC: 31.3 G/DL
MCV RBC AUTO: 94.2 FL
MEV IGG FLD QL IA: 285 AU/ML
MEV IGG+IGM SER-IMP: POSITIVE
MONOCYTES # BLD AUTO: 0.37 K/UL
MONOCYTES NFR BLD AUTO: 6.8 %
MUV AB SER-ACNC: POSITIVE
MUV IGG SER QL IA: 201 AU/ML
N GONORRHOEA RRNA SPEC QL NAA+PROBE: NOT DETECTED
NEUTROPHILS # BLD AUTO: 3.47 K/UL
NEUTROPHILS NFR BLD AUTO: 63.4 %
NITRITE URINE: NEGATIVE
PH URINE: 7
PLATELET # BLD AUTO: 186 K/UL
POTASSIUM SERPL-SCNC: 5 MMOL/L
PROT SERPL-MCNC: 6.9 G/DL
PROTEIN URINE: NEGATIVE
RBC # BLD: 4.45 M/UL
RBC # FLD: 13.3 %
RUBV IGG FLD-ACNC: 2.16 INDEX
RUBV IGG SER-IMP: POSITIVE
SODIUM SERPL-SCNC: 137 MMOL/L
SOURCE TP AMPLIFICATION: NORMAL
SPECIFIC GRAVITY URINE: 1.01
T PALLIDUM AB SER QL IA: NEGATIVE
UROBILINOGEN URINE: 0.2 (ref 0.2–?)
VZV AB TITR SER: POSITIVE
VZV IGG SER IF-ACNC: 2.22 S/CO
WBC # FLD AUTO: 5.47 K/UL

## 2025-05-18 PROBLEM — E03.9 HYPOTHYROID: Status: ACTIVE | Noted: 2025-05-18

## 2025-05-18 PROBLEM — Z87.898 HISTORY OF DYSURIA: Status: RESOLVED | Noted: 2024-01-17 | Resolved: 2025-05-18

## 2025-05-18 PROBLEM — M62.08 DIASTASIS RECTI: Status: RESOLVED | Noted: 2024-02-21 | Resolved: 2025-05-18

## 2025-05-18 LAB
BACTERIA UR CULT: NORMAL
LEAD BLD-MCNC: <1 UG/DL

## 2025-05-19 ENCOUNTER — NON-APPOINTMENT (OUTPATIENT)
Age: 35
End: 2025-05-19

## 2025-05-22 LAB — CYTOLOGY CVX/VAG DOC THIN PREP: NORMAL

## 2025-05-29 ENCOUNTER — NON-APPOINTMENT (OUTPATIENT)
Age: 35
End: 2025-05-29

## 2025-05-29 ENCOUNTER — TRANSCRIPTION ENCOUNTER (OUTPATIENT)
Age: 35
End: 2025-05-29

## 2025-06-11 ENCOUNTER — TRANSCRIPTION ENCOUNTER (OUTPATIENT)
Age: 35
End: 2025-06-11

## 2025-06-17 ENCOUNTER — NON-APPOINTMENT (OUTPATIENT)
Age: 35
End: 2025-06-17

## 2025-06-18 ENCOUNTER — APPOINTMENT (OUTPATIENT)
Dept: OBGYN | Facility: CLINIC | Age: 35
End: 2025-06-18
Payer: COMMERCIAL

## 2025-06-18 VITALS
BODY MASS INDEX: 16.9 KG/M2 | SYSTOLIC BLOOD PRESSURE: 100 MMHG | WEIGHT: 99 LBS | DIASTOLIC BLOOD PRESSURE: 60 MMHG | HEIGHT: 64 IN

## 2025-06-18 PROBLEM — Z34.92 SECOND TRIMESTER PREGNANCY: Status: ACTIVE | Noted: 2025-06-18

## 2025-06-18 PROBLEM — Z34.93 THIRD TRIMESTER PREGNANCY: Status: RESOLVED | Noted: 2023-11-02 | Resolved: 2025-06-18

## 2025-06-18 LAB
APPEARANCE: CLEAR
BILIRUBIN URINE: NEGATIVE
BLOOD URINE: NEGATIVE
COLOR: YELLOW
GLUCOSE QUALITATIVE U: NEGATIVE
KETONES URINE: NEGATIVE
LEUKOCYTE ESTERASE URINE: NEGATIVE
NITRITE URINE: NEGATIVE
PH URINE: 6
PROTEIN URINE: NEGATIVE
SPECIFIC GRAVITY URINE: <=1.005
UROBILINOGEN URINE: 0.2 (ref 0.2–?)

## 2025-06-18 PROCEDURE — 0502F SUBSEQUENT PRENATAL CARE: CPT

## 2025-07-03 ENCOUNTER — NON-APPOINTMENT (OUTPATIENT)
Age: 35
End: 2025-07-03

## 2025-07-03 ENCOUNTER — TRANSCRIPTION ENCOUNTER (OUTPATIENT)
Age: 35
End: 2025-07-03

## 2025-07-17 ENCOUNTER — APPOINTMENT (OUTPATIENT)
Dept: OBGYN | Facility: CLINIC | Age: 35
End: 2025-07-17
Payer: COMMERCIAL

## 2025-07-17 VITALS
BODY MASS INDEX: 17.24 KG/M2 | DIASTOLIC BLOOD PRESSURE: 60 MMHG | WEIGHT: 101 LBS | HEIGHT: 64 IN | SYSTOLIC BLOOD PRESSURE: 90 MMHG

## 2025-07-17 PROBLEM — O09.522 MULTIGRAVIDA OF ADVANCED MATERNAL AGE IN SECOND TRIMESTER: Status: ACTIVE | Noted: 2025-07-17

## 2025-07-17 PROBLEM — R63.6 UNDERWEIGHT (BMI < 18.5): Status: ACTIVE | Noted: 2025-07-17

## 2025-07-17 PROCEDURE — 36415 COLL VENOUS BLD VENIPUNCTURE: CPT

## 2025-07-17 PROCEDURE — 0502F SUBSEQUENT PRENATAL CARE: CPT

## 2025-07-18 LAB
2ND TRIMESTER 4 SCREEN SERPL-IMP: NO
AFP ADJ MOM SERPL: 0.69
AFP INTERP SERPL-IMP: NORMAL
AFP INTERP SERPL-IMP: NORMAL
AFP MOM CUT-OFF: 2.5
AFP PERCENTILE: 12.3
AFP SERPL-ACNC: 36.22 NG/ML
CARBAMAZEPINE?: NO
CURRENT SMOKER: NORMAL
DIABETES STATUS PATIENT: NORMAL
GA: NORMAL
GESTATIONAL AGE METHOD: NORMAL
HX OF NTD NARR: NORMAL
MULTIPLE PREGNANCY: NORMAL
NEURAL TUBE DEFECT RISK FETUS: NORMAL
NEURAL TUBE DEFECT RISK POP: NORMAL
TEST PERFORMANCE INFO SPEC: NORMAL
VALPROIC ACID?: NORMAL

## 2025-08-14 ENCOUNTER — APPOINTMENT (OUTPATIENT)
Dept: OBGYN | Facility: CLINIC | Age: 35
End: 2025-08-14
Payer: COMMERCIAL

## 2025-08-14 VITALS
HEIGHT: 64 IN | WEIGHT: 104 LBS | SYSTOLIC BLOOD PRESSURE: 100 MMHG | BODY MASS INDEX: 17.75 KG/M2 | DIASTOLIC BLOOD PRESSURE: 60 MMHG

## 2025-08-14 PROCEDURE — 0502F SUBSEQUENT PRENATAL CARE: CPT

## 2025-08-15 LAB
APPEARANCE: CLEAR
BILIRUBIN URINE: NEGATIVE
BLOOD URINE: NEGATIVE
COLOR: YELLOW
GLUCOSE QUALITATIVE U: NEGATIVE
KETONES URINE: NEGATIVE
LEUKOCYTE ESTERASE URINE: NEGATIVE
NITRITE URINE: NEGATIVE
PH URINE: 7.5
PROTEIN URINE: NEGATIVE
SPECIFIC GRAVITY URINE: 1.01
UROBILINOGEN URINE: 0.2 (ref 0.2–?)

## 2025-09-09 ENCOUNTER — APPOINTMENT (OUTPATIENT)
Dept: OBGYN | Facility: CLINIC | Age: 35
End: 2025-09-09
Payer: COMMERCIAL

## 2025-09-09 VITALS
DIASTOLIC BLOOD PRESSURE: 63 MMHG | HEIGHT: 64 IN | WEIGHT: 106.56 LBS | BODY MASS INDEX: 18.19 KG/M2 | SYSTOLIC BLOOD PRESSURE: 98 MMHG

## 2025-09-09 LAB
APPEARANCE: CLEAR
BILIRUBIN URINE: NEGATIVE
BLOOD URINE: NEGATIVE
COLOR: YELLOW
GLUCOSE QUALITATIVE U: NEGATIVE
KETONES URINE: NEGATIVE
LEUKOCYTE ESTERASE URINE: NEGATIVE
NITRITE URINE: NEGATIVE
PH URINE: 7
PROTEIN URINE: NEGATIVE
SPECIFIC GRAVITY URINE: 1.01
UROBILINOGEN URINE: 0.2 (ref 0.2–?)

## 2025-09-09 PROCEDURE — 0502F SUBSEQUENT PRENATAL CARE: CPT

## 2025-09-09 PROCEDURE — G0008: CPT

## 2025-09-09 PROCEDURE — 90656 IIV3 VACC NO PRSV 0.5 ML IM: CPT
